# Patient Record
Sex: MALE | Race: WHITE | Employment: FULL TIME | URBAN - NONMETROPOLITAN AREA
[De-identification: names, ages, dates, MRNs, and addresses within clinical notes are randomized per-mention and may not be internally consistent; named-entity substitution may affect disease eponyms.]

---

## 2018-07-31 ENCOUNTER — HOSPITAL ENCOUNTER (OUTPATIENT)
Dept: ULTRASOUND IMAGING | Age: 48
Discharge: HOME | End: 2018-07-31
Payer: COMMERCIAL

## 2018-07-31 DIAGNOSIS — R10.9 ABDOMINAL PAIN, UNSPECIFIED ABDOMINAL LOCATION: ICD-10-CM

## 2018-07-31 PROCEDURE — 76705 ECHO EXAM OF ABDOMEN: CPT

## 2018-09-27 NOTE — PROGRESS NOTES
[facility]  [facility]  New Patient Evaluation in Office  Pt Name: Sarah Hawley  Date of Birth 1970   Today's Date: 9/28/2018  Medical Record Number: [id number]  Referring Provider: No ref. provider found  Primary Care Provider: Connie Wells MD  Chief Complaint   Patient presents with   [facility] Surgical Consult     new pt-screening colonoscopy-family history-no previous     ASSESSMENT       Diagnosis Orders   1. Screening for colorectal cancer  COLONOSCOPY W/ OR W/O BIOPSY     Past Medical History:   Diagnosis Date    Anxiety     Depression     Diabetes ([facility] [address]     Hyperlipidemia     Hypertension           PLANS      1. Schedule Jerrod for colonoscopy with possible biopsy/polypectomy  2. Potential diagnostic/therapeutic options and alternatives were discussed with the patient in the office. Potential risks of bleeding, infection, perforation and missed lesions was reviewed. Potential for biopsy and/or polypectomy was discussed. We also discussed the use of IV sedation and colon preparation instructions. The patient was given an opportunity to ask questions. Once answered, the patient was agreeable to proceed with the procedure. 3. Status: Outpatient in endoscopy  4. Planned anesthesia: IV sedation provided by myself  5. Perioperative bowel preparation with Miralax and Dulcolax. Instructions given and questions answered. Robert Rodriguez is a 52 y.o. male seen in the consultation for evaluation regarding colonoscopy. He has never had a colonoscopy. He has a family history of colon cancer. He denies any history of previous adenomatous polyp, previous colon cancer, rectal bleeding, melena, iron deficiency anemia, chronic abdominal pain, change in bowel habits, unexplained weight loss.   Past Medical History  Past Medical History:   Diagnosis Date    Anxiety     Depression     Diabetes ([facility] [address]     Hyperlipidemia     Hypertension      Past Surgical History  Past Surgical History:   Procedure Laterality Date    CYST REMOVAL  1993    [facility]     Medications  Current Outpatient Prescriptions   Medication Sig Dispense Refill    metFORMIN (GLUCOPHAGE) 500 MG tablet Take 500 mg by mouth 2 times daily (with meals)      Azilsartan-Chlorthalidone (EDARBYCLOR) 40-25 MG TABS Take by mouth      amLODIPine (NORVASC) 5 MG tablet Take 5 mg by mouth daily      lamoTRIgine (LAMICTAL) 25 MG tablet Take 25 mg by mouth daily       No current facility-administered medications for this visit. Allergies  has No Known Allergies. Family History  family history includes Cancer in his maternal uncle and mother. Social History   reports that he has never smoked. He has never used smokeless tobacco. He reports that he does not drink alcohol or use drugs. Health Screening Exams  Health Maintenance   Topic Date Due    HIV screen  11/20/1985    DTaP/Tdap/Td vaccine (1 - Tdap) 11/20/1989    Lipid screen  11/20/2010    Diabetes screen  11/20/2010    Flu vaccine (1) 09/01/2018     Review of Systems  Constitutional: Negative for activity change, appetite change, chills, diaphoresis, fatigue, fever and unexpected weight change. HENT: Negative for congestion, dental problem, drooling, ear discharge, ear pain, facial swelling, hearing loss, mouth sores, nosebleeds, postnasal drip, rhinorrhea, sinus pressure, sneezing, sore throat, tinnitus, trouble swallowing and voice change. Eyes: Negative for photophobia, pain, discharge, redness, itching and visual disturbance. Respiratory: Negative for apnea, cough, choking, chest tightness, shortness of breath, wheezing and stridor. Cardiovascular: Negative for chest pain, palpitations and leg swelling. Gastrointestinal: Negative for abdominal distention, abdominal pain, anal bleeding, blood in stool, constipation, diarrhea, nausea, rectal pain and vomiting.    Endocrine: Negative for cold intolerance, heat intolerance, polydipsia, polyphagia and polyuria. Genitourinary: Negative for decreased urine volume, difficulty urinating, dysuria, enuresis, flank pain, frequency, genital sores, hematuria and urgency. Musculoskeletal: Negative for arthralgias, back pain, gait problem, joint swelling, myalgias, neck pain and neck stiffness. Skin: Negative for color change, pallor, rash and wound. Allergic/Immunologic: Negative for environmental allergies, food allergies and immunocompromised state. Neurological: Negative for dizziness, tremors, seizures, syncope, facial asymmetry, speech difficulty, weakness, light-headedness, numbness and headaches. Hematological: Negative for adenopathy. Does not bruise/bleed easily. Psychiatric/Behavioral: Negative for agitation, behavioral problems, confusion, decreased concentration, dysphoric mood, hallucinations, self-injury, sleep disturbance and suicidal ideas. The patient is not nervous/anxious and is not hyperactive. OBJECTIVE    VITALS:  height is 5' 11\" (1.803 m) and weight is 276 lb 4.8 oz (125.3 kg). His tympanic temperature is 98.6 °F (37 °C). His blood pressure is 136/80 and his pulse is 80. His respiration is 16 and oxygen saturation is 97%. CONSTITUTIONAL: Alert and oriented times 3, no acute distress and cooperative to examination with proper mood and affect. SKIN: Skin color, texture, turgor normal. No rashes or lesions. LYMPH: no cervical nodes, no inguinal nodes  HEENT: Head is normocephalic, atraumatic. EOMI, PERRLA. NECK: Supple, symmetrical, trachea midline, no adenopathy, thyroid symmetric, not enlarged and no tenderness, skin normal.  CHEST/LUNGS: chest symmetric with normal A/P diameter, normal respiratory rate and rhythm, lungs clear to auscultation without wheezes, rales or rhonchi. No accessory muscle use. Scars None   CARDIOVASCULAR: Heart sounds are normal.  Regular rate and rhythm without murmur, gallop or rub. Normal S1 and S2.  Carotid and femoral pulses 2+/4 and equal bilaterally. ABDOMEN: obese No scar(s) present. Normal bowel sounds. No bruits. soft, nontender, nondistended, no masses or organomegaly. no evidence of hernia. Percussion: Normal without hepatosplenomegally. RECTAL: deferred, not clinically indicated  NEUROLOGIC: There are no focalizing motor or sensory deficits. CN II-XII are grossly intact. Janace [place] EXTREMITIES: no cyanosis, no clubbing and no edema. Thank you for the interesting evaluation. Further recommendations as listed above.         Electronically signed by Eder Mace DO on 9/28/2018 at 1:26 PM

## 2018-09-28 ENCOUNTER — OFFICE VISIT (OUTPATIENT)
Dept: SURGERY | Age: 48
End: 2018-09-28

## 2018-09-28 VITALS
RESPIRATION RATE: 16 BRPM | HEIGHT: 71 IN | TEMPERATURE: 98.6 F | HEART RATE: 80 BPM | DIASTOLIC BLOOD PRESSURE: 80 MMHG | SYSTOLIC BLOOD PRESSURE: 136 MMHG | WEIGHT: 276.3 LBS | BODY MASS INDEX: 38.68 KG/M2 | OXYGEN SATURATION: 97 %

## 2018-09-28 DIAGNOSIS — Z12.12 SCREENING FOR COLORECTAL CANCER: Primary | ICD-10-CM

## 2018-09-28 DIAGNOSIS — Z12.11 SCREENING FOR COLORECTAL CANCER: Primary | ICD-10-CM

## 2018-09-28 PROCEDURE — 99024 POSTOP FOLLOW-UP VISIT: CPT | Performed by: SURGERY

## 2018-09-28 RX ORDER — LAMOTRIGINE 25 MG/1
25 TABLET ORAL DAILY
COMMUNITY

## 2018-09-28 RX ORDER — AMLODIPINE BESYLATE 5 MG/1
10 TABLET ORAL DAILY
COMMUNITY

## 2018-09-28 ASSESSMENT — ENCOUNTER SYMPTOMS
SINUS PRESSURE: 0
CHEST TIGHTNESS: 0
DIARRHEA: 0
BLOOD IN STOOL: 0
VOMITING: 0
SORE THROAT: 0
WHEEZING: 0
TROUBLE SWALLOWING: 0
CONSTIPATION: 0
COLOR CHANGE: 0
CHOKING: 0
RHINORRHEA: 0
SHORTNESS OF BREATH: 0
RECTAL PAIN: 0
EYE PAIN: 0
PHOTOPHOBIA: 0
FACIAL SWELLING: 0
NAUSEA: 0
COUGH: 0
ABDOMINAL PAIN: 0
BACK PAIN: 0
EYE DISCHARGE: 0
EYE ITCHING: 0
STRIDOR: 0
ABDOMINAL DISTENTION: 0
EYE REDNESS: 0
VOICE CHANGE: 0
ANAL BLEEDING: 0
APNEA: 0

## 2018-09-28 NOTE — PROGRESS NOTES
Subjective:      Patient ID: Ava Pabon is a 52 y.o. male. Chief Complaint   Patient presents with    Surgical Consult     new pt-screening colonoscopy-family history-no previous       HPI    Review of Systems   Constitutional: Negative for activity change, appetite change, chills, diaphoresis, fatigue, fever and unexpected weight change. HENT: Negative for congestion, dental problem, drooling, ear discharge, ear pain, facial swelling, hearing loss, mouth sores, nosebleeds, postnasal drip, rhinorrhea, sinus pressure, sneezing, sore throat, tinnitus, trouble swallowing and voice change. Eyes: Negative for photophobia, pain, discharge, redness, itching and visual disturbance. Respiratory: Negative for apnea, cough, choking, chest tightness, shortness of breath, wheezing and stridor. Cardiovascular: Negative for chest pain, palpitations and leg swelling. Gastrointestinal: Negative for abdominal distention, abdominal pain, anal bleeding, blood in stool, constipation, diarrhea, nausea, rectal pain and vomiting. Endocrine: Negative for cold intolerance, heat intolerance, polydipsia, polyphagia and polyuria. Genitourinary: Negative for decreased urine volume, difficulty urinating, dysuria, enuresis, flank pain, frequency, genital sores, hematuria and urgency. Musculoskeletal: Negative for arthralgias, back pain, gait problem, joint swelling, myalgias, neck pain and neck stiffness. Skin: Negative for color change, pallor, rash and wound. Allergic/Immunologic: Negative for environmental allergies, food allergies and immunocompromised state. Neurological: Negative for dizziness, tremors, seizures, syncope, facial asymmetry, speech difficulty, weakness, light-headedness, numbness and headaches. Hematological: Negative for adenopathy. Does not bruise/bleed easily.    Psychiatric/Behavioral: Negative for agitation, behavioral problems, confusion, decreased concentration, dysphoric mood,

## 2018-09-28 NOTE — LETTER
[facility]  Sheryle RidesRosalinda Upton  Phone- [phone]  Fax [phone]    Pt Name: Sanaz Carrington  Medical Record Number: [id number]  Date of Birth 1970   Today's Date: 9/28/2018    CHEL EYAD dhruv Soto was seen in consultation in the office today. My assessment and plans are listed below. ASSESSMENT         Diagnosis Orders   1. Screening for colorectal cancer  COLONOSCOPY W/ OR W/O BIOPSY     Past Medical History:   Diagnosis Date    Anxiety     Depression     Diabetes ([address]     Hyperlipidemia     Hypertension          PLANS:      1. Schedule Jerrod for colonoscopy with possible biopsy/polypectomy  2. Potential diagnostic/therapeutic options and alternatives were discussed with the patient in the office. Potential risks of bleeding, infection, perforation and missed lesions was reviewed. Potential for biopsy and/or polypectomy was discussed. We also discussed the use of IV sedation and colon preparation instructions. The patient was given an opportunity to ask questions. Once answered, the patient was agreeable to proceed with the procedure. 3. Status: Outpatient in endoscopy  4. Planned anesthesia: IV sedation provided by myself  5. Perioperative bowel preparation with Miralax and Dulcolax. Instructions given and questions answered. If I can provide any additional assistance or you have any concerns, please feel free to contact me. Thank you for allowing to participate in the care of your patients. Sincerely,      Sheryle Rides.  Birdie Chambers

## 2018-10-15 ENCOUNTER — HOSPITAL ENCOUNTER (OUTPATIENT)
Age: 48
Setting detail: OUTPATIENT SURGERY
Discharge: HOME | End: 2018-10-15
Attending: SURGERY | Admitting: SURGERY
Payer: COMMERCIAL

## 2018-10-15 VITALS
DIASTOLIC BLOOD PRESSURE: 55 MMHG | HEIGHT: 71 IN | TEMPERATURE: 97.1 F | SYSTOLIC BLOOD PRESSURE: 116 MMHG | BODY MASS INDEX: 37.57 KG/M2 | WEIGHT: 268.4 LBS | OXYGEN SATURATION: 97 % | HEART RATE: 67 BPM | RESPIRATION RATE: 18 BRPM

## 2018-10-15 DIAGNOSIS — Z12.12 SCREENING FOR COLORECTAL CANCER: ICD-10-CM

## 2018-10-15 DIAGNOSIS — Z12.11 SCREENING FOR COLORECTAL CANCER: ICD-10-CM

## 2018-10-15 PROCEDURE — 2580000003 HC RX 258: Performed by: SURGERY

## 2018-10-15 PROCEDURE — 7100000001 HC PACU RECOVERY - ADDTL 15 MIN: Performed by: SURGERY

## 2018-10-15 PROCEDURE — 99152 MOD SED SAME PHYS/QHP 5/>YRS: CPT | Performed by: SURGERY

## 2018-10-15 PROCEDURE — 6360000002 HC RX W HCPCS: Performed by: SURGERY

## 2018-10-15 PROCEDURE — 45378 DIAGNOSTIC COLONOSCOPY: CPT | Performed by: SURGERY

## 2018-10-15 PROCEDURE — 7100000000 HC PACU RECOVERY - FIRST 15 MIN: Performed by: SURGERY

## 2018-10-15 PROCEDURE — 3609027000 HC COLONOSCOPY: Performed by: SURGERY

## 2018-10-15 PROCEDURE — 2709999900 HC NON-CHARGEABLE SUPPLY: Performed by: SURGERY

## 2018-10-15 RX ORDER — FENTANYL CITRATE 50 UG/ML
INJECTION, SOLUTION INTRAMUSCULAR; INTRAVENOUS PRN
Status: DISCONTINUED | OUTPATIENT
Start: 2018-10-15 | End: 2018-10-15 | Stop reason: HOSPADM

## 2018-10-15 RX ORDER — ALLOPURINOL 100 MG/1
100 TABLET ORAL DAILY
COMMUNITY

## 2018-10-15 RX ORDER — 0.9 % SODIUM CHLORIDE 0.9 %
10 VIAL (ML) INJECTION EVERY 12 HOURS SCHEDULED
Status: DISCONTINUED | OUTPATIENT
Start: 2018-10-15 | End: 2018-10-15 | Stop reason: HOSPADM

## 2018-10-15 RX ORDER — SODIUM CHLORIDE 450 MG/100ML
INJECTION, SOLUTION INTRAVENOUS CONTINUOUS
Status: DISCONTINUED | OUTPATIENT
Start: 2018-10-15 | End: 2018-10-15 | Stop reason: HOSPADM

## 2018-10-15 RX ORDER — MIDAZOLAM HYDROCHLORIDE 1 MG/ML
INJECTION INTRAMUSCULAR; INTRAVENOUS PRN
Status: DISCONTINUED | OUTPATIENT
Start: 2018-10-15 | End: 2018-10-15 | Stop reason: HOSPADM

## 2018-10-15 RX ORDER — 0.9 % SODIUM CHLORIDE 0.9 %
10 VIAL (ML) INJECTION PRN
Status: DISCONTINUED | OUTPATIENT
Start: 2018-10-15 | End: 2018-10-15 | Stop reason: HOSPADM

## 2018-10-15 RX ADMIN — SODIUM CHLORIDE: 4.5 INJECTION, SOLUTION INTRAVENOUS at 08:09

## 2018-10-15 ASSESSMENT — PAIN SCALES - GENERAL
PAINLEVEL_OUTOF10: 0

## 2018-10-15 ASSESSMENT — PAIN - FUNCTIONAL ASSESSMENT: PAIN_FUNCTIONAL_ASSESSMENT: 0-10

## 2018-10-15 NOTE — H&P
[address] [address]  Sedation/Analgesia History & Physical  Dr. Leonel Espinoza. Trish    Pt Name: Loreta Beach  MRN: [id number]  YOB: 1970  Provider Performing Procedure: Hawa Chand  Primary Care Physician: Stephan Reynolds MD  PRE-PROCEDURE   DNR-CCA/DNR-CC - No  Brief History/Pre-Procedure Diagnosis: SCREENING    MEDICAL HISTORY       has a past medical history of Anxiety; Arthritis; Depression; Diabetes ([address] Hyperlipidemia; and Hypertension. SURGICAL HISTORY   has a past surgical history that includes cyst removal (1993). ALLERGIES   Allergies as of 09/28/2018    (No Known Allergies)     MEDICATIONS   Coumadin Use Last 7 Days: No  Antiplatelet drug therapy use last 7 days: No  Other anticoagulant use last 7 days: No  Prior to Admission medications    Medication Sig Start Date End Date Taking? Authorizing Provider   allopurinol (ZYLOPRIM) 100 MG tablet Take 100 mg by mouth daily   Yes Historical Provider, MD   metFORMIN (GLUCOPHAGE) 500 MG tablet Take 500 mg by mouth 2 times daily (with meals)   Yes Historical Provider, MD   Azilsartan-Chlorthalidone (EDARBYCLOR) 40-25 MG TABS Take by mouth   Yes Historical Provider, MD   amLODIPine (NORVASC) 5 MG tablet Take 5 mg by mouth daily   Yes Historical Provider, MD   lamoTRIgine (LAMICTAL) 25 MG tablet Take 25 mg by mouth daily   Yes Historical Provider, MD     PHYSICAL EXAMINATION   Vitals:  height is 5' 11\" (1.803 m) and weight is 268 lb 6.4 oz (121.7 kg). His temporal temperature is 97.9 °F (36.6 °C). His blood pressure is 130/60 and his pulse is 67. His respiration is 18 and oxygen saturation is 95%.    Mental Status: alert, cooperative  Heart:   Heart regular rate and rhythm     Lungs:  Clear      Abdomen:  Soft, nontender       PLANNED PROCEDURE   Colonoscopy  SEDATION   Planned agent for conscious sedation: Fentanyl, Versed  ASA Classification: 2  Class 1: A normal healthy patient  Class 2: Pt with mild to moderate

## 2018-10-15 NOTE — OP NOTE
[facility]  RECORD OF COLONOSCOPY  PATIENT NAME: Cyrus Riley  MEDICAL RECORD NO. [id number]  SURGEON: Yair Morejon. LEOLA Chambers [facility]  PRIMARY CARE PHYSICIAN: Xiao Gonzales MD     PROCEDURE PERFORMED:  10/15/2018   PREOPERATIVE DIAGNOSIS:  SCREENING  POSTOPERATIVE DIAGNOSIS:  Sigmoid diverticulosis  PROCEDURE PERFORMED:  Colonoscopy   ANESTHESIA:  IV sedation with 100 mcg Fentanyl and 7.5 mg Versed  ESTIMATED BLOOD LOSS:  None. SPECIMENS: None  COMPLICATIONS:  None immediately appreciated. DISCUSSION:  Rolly Solorzano is a 52y.o.-year-old male who presented to my office and seen in evaluation at request of Xiao Gonzales MD. After history and physical examination was performed, potential diagnostic and therapeutic modalities discussed with the patient. Radiographic and endoscopic studies were discussed, risks, complications, benefits reviewed. He was given opportunity to ask questions, once answeredinformed consent was obtained. The patient was the Endoscopy Suite on 10/15/2018 for procedure. OPERATIVE FINDINGS:  At the time of endoscopy good prep appreciated. Scope was able to be advanced to level of cecum. There was no evidence of intraluminal or mucosal pathology with the exception of sigmoid diverticulosis. PROCEDURE:  The patient was brought to endoscopy suite, placed in left lateral Bruno position, placed under continuous cardiac telemetry, blood pressure, pulse oximetry monitoring, placed under IV sedation with medications noted above, done in incremental fashion to achieve sedation. Digital rectal exam performed revealing adequate rectal tone, no masses palpable. Colonoscope advanced to rectum, rectum gently insufflated. Under direct visualization colonoscope was advanced entirety of colon to level of cecum, confirmed by ileocecal valve, triangle folds, appendiceal orifice. The scope was slowly retracted, intraluminal structures inspected.  The scope was retracted through the

## 2020-01-30 NOTE — PROGRESS NOTES
0-64 years Vaccine  Aged Out     Review of Systems  History obtained from the patient. Constitutional: Denies any fever, chills. Derm: Denies any rash or skin color change. Eyes: Denies blurred or decreased in vision. Ent: Denies any tinnitus or vertigo. Resp: Denies any cough or shortness of breath. CV: Denies any syncope, palpitations or chest pain. GI:  Admits to abdominal pain and constipation, denies nausea, vomiting or diarrhea. : Denies any hematuria, hesitancy, or dysuria. Heme/Lymph: Denies any bleeding. Musculoskeletal: Denies any myalgias, muscle weakness or neck pain. Neuro: Denies any dizziness, paresthesia or weakness. OBJECTIVE      VITALS:  height is 5' 11\" (1.803 m) and weight is 252 lb 9.6 oz (114.6 kg). His temporal temperature is 97.4 °F (36.3 °C). His blood pressure is 142/86 (abnormal) and his pulse is 83. His respiration is 15 and oxygen saturation is 98%. Pain Score:   2 Body mass index is 35.23 kg/m². CONSTITUTIONAL: Alert and oriented times 3, no acute distress and cooperative to examination with proper mood and affect. SKIN: Skin color, texture, turgor normal. No rashes or lesions. LYMPH: no cervical nodes, no inguinal nodes  HEENT: Head is normocephalic, atraumatic. EOMI, PERRLA. NECK: Supple, symmetrical, trachea midline, no adenopathy, thyroid symmetric, not enlarged and no tenderness, skin normal.  CHEST/LUNGS: chest symmetric with normal A/P diameter, normal respiratory rate and rhythm, lungs clear to auscultation without wheezes, rales or rhonchi. No accessory muscle use. Scars None   CARDIOVASCULAR: Heart sounds are normal.  Regular rate and rhythm without murmur, gallop or rub. Normal S1 and S2. Carotid and femoral pulses 2+/4 and equal bilaterally. ABDOMEN: obese No scar(s) present. Normal bowel sounds. No bruits. soft, nondistended, no masses or organomegaly. no evidence of hernia. Percussion: Normal without hepatosplenomegally.  Gallbladder is nonpalpable. He has tenderness right lateral abdomen but not right subcostal.   RECTAL: deferred, not clinically indicated  NEUROLOGIC: There are no focalizing motor or sensory deficits. CN II-XII are grossly intact. [place] EXTREMITIES: no cyanosis, no clubbing and no edema. Thank you for the interesting evaluation. Further recommendations as listed above.        Electronically signed by Oly Kennedy DO on 1/31/2020 at 1:54 PM

## 2020-01-31 ENCOUNTER — OFFICE VISIT (OUTPATIENT)
Dept: SURGERY | Age: 50
End: 2020-01-31
Payer: COMMERCIAL

## 2020-01-31 VITALS
HEART RATE: 83 BPM | SYSTOLIC BLOOD PRESSURE: 142 MMHG | BODY MASS INDEX: 35.36 KG/M2 | RESPIRATION RATE: 15 BRPM | DIASTOLIC BLOOD PRESSURE: 86 MMHG | WEIGHT: 252.6 LBS | TEMPERATURE: 97.4 F | HEIGHT: 71 IN | OXYGEN SATURATION: 98 %

## 2020-01-31 PROCEDURE — 99213 OFFICE O/P EST LOW 20 MIN: CPT | Performed by: SURGERY

## 2020-01-31 RX ORDER — ACETAMINOPHEN 160 MG
TABLET,DISINTEGRATING ORAL
COMMUNITY

## 2020-01-31 RX ORDER — POTASSIUM CITRATE 10 MEQ/1
99 TABLET, EXTENDED RELEASE ORAL 2 TIMES DAILY
COMMUNITY
End: 2021-05-13

## 2020-01-31 RX ORDER — MULTIVIT WITH MINERALS/LUTEIN
1000 TABLET ORAL 2 TIMES DAILY
COMMUNITY

## 2020-01-31 NOTE — PATIENT INSTRUCTIONS
Patient Education        Cholecystectomy: Before Your Surgery  What is cholecystectomy? Cholecystectomy (tm-bmy-asa-JONN-tuh-zaki) is a type of surgery. It removes a diseased gallbladder. This surgery is usually done as a laparoscopic surgery. The doctor puts a lighted tube and other surgical tools through small cuts (incisions) in your belly. The tube is called a scope. It lets your doctor see your organs so he or she can do the surgery. The incisions leave scars that fade with time. Most people go home the same day. You probably will feel better each day. Most people have only a small amount of pain after 1 week. If you have a desk job, you can probably go back to work in 1 to 2 weeks. If you lift heavy objects or have a very active job, it may take up to 4 weeks. In some cases, open surgery is the best choice. Your doctor may choose open surgery in advance. Or he or she may choose it in the middle of laparoscopic surgery. In open surgery, the doctor makes a larger incision in your upper belly. If you have open surgery, you will probably stay in the hospital for 2 to 4 days. And it may take 4 to 6 weeks to get back to your normal routine. Follow-up care is a key part of your treatment and safety. Be sure to make and go to all appointments, and call your doctor if you are having problems. It's also a good idea to know your test results and keep a list of the medicines you take. What happens before surgery?   Surgery can be stressful. This information will help you understand what you can expect. And it will help you safely prepare for surgery.   Preparing for surgery    · Understand exactly what surgery is planned, along with the risks, benefits, and other options. · Tell your doctors ALL the medicines, vitamins, supplements, and herbal remedies you take.  Some of these can increase the risk of bleeding or interact with anesthesia.     · If you take aspirin or some other blood thinner, ask your doctor if Instructions. \"     If you do not have an account, please click on the \"Sign Up Now\" link. Current as of: August 21, 2019  Content Version: 12.3  © 2565-5866 [employer]wise, [place]. Care instructions adapted under license by [facility] ([facility]). If you have questions about a medical condition or this instruction, always ask your healthcare professional. [address] any warranty or liability for your use of this information. Patient Education        HIDA Scan: About This Test  What is it? A HIDA scan is an imaging test that checks how your gallbladder is working. The gallbladder is a small sac under your liver. It stores bile, a fluid that helps your body digest fats. If there are problems with the gallbladder, such as gallstones, the gallbladder may not store or empty bile properly. During a HIDA scan, a camera takes pictures of your gallbladder after a radioactive tracer is injected into a vein in your arm. The tracer travels through your liver, gallbladder, bile ducts, and small intestine. The camera takes a series of pictures of the tracer as it moves along. Your doctor can use these pictures to look for leaks, blockages, or any other problems. Why is this test done? The HIDA scan may be done to:  · Help find the cause of pain in the upper belly, especially if the pain is on the right side. · Find out if bile is leaking. · Find anything that may be blocking the bile ducts. A HIDA scan is sometimes done if an earlier ultrasound test did not give enough information. How do you prepare for the test?  · If you are breastfeeding, you may want to pump enough breast milk before the test to get through 1 to 2 days of feeding. The radioactive tracer used in this test can get into your breast milk and is not good for the baby. · The doctor may tell you not to eat or drink anything but water for 4 to 6 hours before the test. Follow all instructions carefully.  If you haven't eaten for more than 24 hours before the test, tell your doctor. How is the test done? · You will remove any clothing around your belly. You will be given a gown or paper covering to use during the test.  · You will lie on your back on a table. · A thin tube, called an IV, will be put into a vein in your arm. · A radioactive tracer chemical will be injected into the IV. A medicine that stimulates your gallbladder may also be injected. · The scanning camera will be placed close over your belly. · A picture will be taken right away. The whole scan may last up to 60 minutes as the tracer passes through your liver and into your gallbladder and small intestine. Several more pictures, each lasting a few minutes, may be taken over the next 2 to 4 hours. Each picture will take only a few minutes, but you will have to lie still for the whole test.  What else should you know about the test?  · The HIDA scan itself is painless, but you may feel a brief sting or pinch as the IV is placed in your arm. · You may feel a brief pain in your belly as the medicine that stimulates your gallbladder starts to work. · Anytime you're exposed to radiation, there's a small chance of damage to cells or tissue. That's the case even with the low-level radioactive tracer used for this test. But the chance of damage is very low compared with the benefits of the test.  · The camera itself does not produce any radiation. · Most of the tracer will leave your body through your urine or stool within a day. So be sure to flush the toilet right after you use it, and wash your hands well with soap and water. The amount of radiation in the tracer is very small. This means it isn't a risk for people to be around you after the test.  What happens after the test?  · You will probably be able to go home right away. · Your doctor will discuss the results of the test with you. · You can go back to your usual activities right away.   · Anytime you're exposed to

## 2020-01-31 NOTE — [place]  [address] [facility]. [address]  [place] [address]  [phone]     Pt Name: Samanta Kellogg  Medical Record Number: [id number]  Date of Birth 1970   Today's Date: 1/31/2020    Adeola Washington was seen in consultation in the office today. My assessment and plans are listed below. ASSESSMENT      Diagnosis Orders   1. Chronic cholecystitis with calculus  Hepatic Function Panel    NM [facility]   2. RUQ abdominal pain  NM [facility]   3. Epigastric pain  NM [facility]     Past Medical History:   Diagnosis Date    Anxiety     Arthritis     Depression     Diabetes ([facility] [address]     Hyperlipidemia     Hypertension          PLANS:   1. Schedule Jerrod for HIDA scan to further evaluate. His symptoms are atypical of gallbladder disease  2. Follow up after testing completed. If I can provide any additional assistance or you have any concerns, please feel free to contact me. Thank you for allowing to participate in the care of your patients. Sincerely,      Sidra Woodall.  Birdie Chambers

## 2020-02-06 ENCOUNTER — HOSPITAL ENCOUNTER (EMERGENCY)
Age: 50
Discharge: HOME | End: 2020-02-06
Attending: EMERGENCY MEDICINE
Payer: COMMERCIAL

## 2020-02-06 ENCOUNTER — APPOINTMENT (OUTPATIENT)
Dept: CT IMAGING | Age: 50
End: 2020-02-06
Payer: COMMERCIAL

## 2020-02-06 VITALS
OXYGEN SATURATION: 97 % | WEIGHT: 250 LBS | TEMPERATURE: 98.3 F | RESPIRATION RATE: 18 BRPM | HEIGHT: 71 IN | BODY MASS INDEX: 35 KG/M2 | HEART RATE: 69 BPM | DIASTOLIC BLOOD PRESSURE: 80 MMHG | SYSTOLIC BLOOD PRESSURE: 141 MMHG

## 2020-02-06 LAB
ALBUMIN SERPL-MCNC: 4.6 G/DL (ref 3.5–5.1)
ALP BLD-CCNC: 33 U/L (ref 38–126)
ALT SERPL-CCNC: 42 U/L (ref 11–66)
ANION GAP SERPL CALCULATED.3IONS-SCNC: 15 MEQ/L (ref 8–16)
AST SERPL-CCNC: 30 U/L (ref 5–40)
BACTERIA: ABNORMAL /HPF
BASOPHILS # BLD: 0.3 %
BASOPHILS ABSOLUTE: 0 THOU/MM3 (ref 0–0.1)
BILIRUB SERPL-MCNC: 0.3 MG/DL (ref 0.3–1.2)
BILIRUBIN DIRECT: < 0.2 MG/DL (ref 0–0.3)
BILIRUBIN URINE: NEGATIVE
BLOOD, URINE: NEGATIVE
BUN BLDV-MCNC: 12 MG/DL (ref 7–22)
CALCIUM SERPL-MCNC: 10 MG/DL (ref 8.5–10.5)
CASTS 2: ABNORMAL /LPF
CASTS UA: ABNORMAL /LPF
CHARACTER, URINE: CLEAR
CHLORIDE BLD-SCNC: 101 MEQ/L (ref 98–111)
CO2: 28 MEQ/L (ref 23–33)
COLOR: YELLOW
CREAT SERPL-MCNC: 1 MG/DL (ref 0.4–1.2)
CRYSTALS, UA: ABNORMAL
EKG ATRIAL RATE: 73 BPM
EKG P AXIS: 62 DEGREES
EKG P-R INTERVAL: 172 MS
EKG Q-T INTERVAL: 400 MS
EKG QRS DURATION: 96 MS
EKG QTC CALCULATION (BAZETT): 440 MS
EKG R AXIS: 48 DEGREES
EKG T AXIS: 37 DEGREES
EKG VENTRICULAR RATE: 73 BPM
EOSINOPHIL # BLD: 1.6 %
EOSINOPHILS ABSOLUTE: 0.2 THOU/MM3 (ref 0–0.4)
EPITHELIAL CELLS, UA: ABNORMAL /HPF
ERYTHROCYTE [DISTWIDTH] IN BLOOD BY AUTOMATED COUNT: 12.7 % (ref 11.5–14.5)
ERYTHROCYTE [DISTWIDTH] IN BLOOD BY AUTOMATED COUNT: 42.3 FL (ref 35–45)
ETHYL ALCOHOL, SERUM: < 0.01 %
GFR SERPL CREATININE-BSD FRML MDRD: 79 ML/MIN/1.73M2
GLUCOSE BLD-MCNC: 97 MG/DL (ref 70–108)
GLUCOSE URINE: NEGATIVE MG/DL
HCT VFR BLD CALC: 48.6 % (ref 42–52)
HEMOGLOBIN: 16.1 GM/DL (ref 14–18)
IMMATURE GRANS (ABS): 0.06 THOU/MM3 (ref 0–0.07)
IMMATURE GRANULOCYTES: 0.6 %
KETONES, URINE: NEGATIVE
LEUKOCYTE ESTERASE, URINE: ABNORMAL
LIPASE: 29.3 U/L (ref 5.6–51.3)
LYMPHOCYTES # BLD: 33.2 %
LYMPHOCYTES ABSOLUTE: 3.6 THOU/MM3 (ref 1–4.8)
MAGNESIUM: 2 MG/DL (ref 1.6–2.4)
MCH RBC QN AUTO: 30.3 PG (ref 26–33)
MCHC RBC AUTO-ENTMCNC: 33.1 GM/DL (ref 32.2–35.5)
MCV RBC AUTO: 91.4 FL (ref 80–94)
MISCELLANEOUS 2: ABNORMAL
MONOCYTES # BLD: 8.6 %
MONOCYTES ABSOLUTE: 0.9 THOU/MM3 (ref 0.4–1.3)
NITRITE, URINE: NEGATIVE
NUCLEATED RED BLOOD CELLS: 0 /100 WBC
OSMOLALITY CALCULATION: 286.5 MOSMOL/KG (ref 275–300)
PH UA: 7.5 (ref 5–9)
PLATELET # BLD: 225 THOU/MM3 (ref 130–400)
PMV BLD AUTO: 10.1 FL (ref 9.4–12.4)
POTASSIUM SERPL-SCNC: 3.4 MEQ/L (ref 3.5–5.2)
PROTEIN UA: NEGATIVE
RBC # BLD: 5.32 MILL/MM3 (ref 4.7–6.1)
RBC URINE: ABNORMAL /HPF
RENAL EPITHELIAL, UA: ABNORMAL
SEG NEUTROPHILS: 55.7 %
SEGMENTED NEUTROPHILS ABSOLUTE COUNT: 6 THOU/MM3 (ref 1.8–7.7)
SODIUM BLD-SCNC: 144 MEQ/L (ref 135–145)
SPECIFIC GRAVITY, URINE: 1 (ref 1–1.03)
TOTAL PROTEIN: 7.5 G/DL (ref 6.1–8)
TROPONIN T: < 0.01 NG/ML
TSH SERPL DL<=0.05 MIU/L-ACNC: 3.3 UIU/ML (ref 0.4–4.2)
UROBILINOGEN, URINE: 0.2 EU/DL (ref 0–1)
WBC # BLD: 10.7 THOU/MM3 (ref 4.8–10.8)
WBC UA: ABNORMAL /HPF
YEAST: ABNORMAL

## 2020-02-06 PROCEDURE — 6370000000 HC RX 637 (ALT 250 FOR IP): Performed by: EMERGENCY MEDICINE

## 2020-02-06 PROCEDURE — 81001 URINALYSIS AUTO W/SCOPE: CPT

## 2020-02-06 PROCEDURE — 84484 ASSAY OF TROPONIN QUANT: CPT

## 2020-02-06 PROCEDURE — 85025 COMPLETE CBC W/AUTO DIFF WBC: CPT

## 2020-02-06 PROCEDURE — 80053 COMPREHEN METABOLIC PANEL: CPT

## 2020-02-06 PROCEDURE — 93005 ELECTROCARDIOGRAM TRACING: CPT | Performed by: EMERGENCY MEDICINE

## 2020-02-06 PROCEDURE — 74177 CT ABD & PELVIS W/CONTRAST: CPT

## 2020-02-06 PROCEDURE — 83735 ASSAY OF MAGNESIUM: CPT

## 2020-02-06 PROCEDURE — 70450 CT HEAD/BRAIN W/O DYE: CPT

## 2020-02-06 PROCEDURE — G0480 DRUG TEST DEF 1-7 CLASSES: HCPCS

## 2020-02-06 PROCEDURE — 83690 ASSAY OF LIPASE: CPT

## 2020-02-06 PROCEDURE — 82248 BILIRUBIN DIRECT: CPT

## 2020-02-06 PROCEDURE — 6360000004 HC RX CONTRAST MEDICATION: Performed by: EMERGENCY MEDICINE

## 2020-02-06 PROCEDURE — 36415 COLL VENOUS BLD VENIPUNCTURE: CPT

## 2020-02-06 PROCEDURE — 84443 ASSAY THYROID STIM HORMONE: CPT

## 2020-02-06 PROCEDURE — 99283 EMERGENCY DEPT VISIT LOW MDM: CPT

## 2020-02-06 RX ORDER — ENALAPRILAT 2.5 MG/2ML
1.25 INJECTION INTRAVENOUS ONCE
Status: DISCONTINUED | OUTPATIENT
Start: 2020-02-06 | End: 2020-02-06 | Stop reason: HOSPADM

## 2020-02-06 RX ORDER — CLONIDINE HYDROCHLORIDE 0.1 MG/1
0.1 TABLET ORAL PRN
Qty: 60 TABLET | Refills: 3 | Status: SHIPPED | OUTPATIENT
Start: 2020-02-06

## 2020-02-06 RX ORDER — PANTOPRAZOLE SODIUM 40 MG/1
40 TABLET, DELAYED RELEASE ORAL ONCE
Status: COMPLETED | OUTPATIENT
Start: 2020-02-06 | End: 2020-02-06

## 2020-02-06 RX ORDER — LACTULOSE 10 G/10G
10 SOLUTION ORAL 2 TIMES DAILY
Qty: 14 PACKET | Refills: 0 | Status: SHIPPED | OUTPATIENT
Start: 2020-02-06 | End: 2020-02-13

## 2020-02-06 RX ORDER — ONDANSETRON 4 MG/1
4 TABLET, ORALLY DISINTEGRATING ORAL ONCE
Status: COMPLETED | OUTPATIENT
Start: 2020-02-06 | End: 2020-02-06

## 2020-02-06 RX ORDER — PANTOPRAZOLE SODIUM 40 MG/1
40 TABLET, DELAYED RELEASE ORAL
Qty: 30 TABLET | Refills: 0 | Status: SHIPPED | OUTPATIENT
Start: 2020-02-06 | End: 2021-05-13

## 2020-02-06 RX ORDER — LORAZEPAM 1 MG/1
1 TABLET ORAL ONCE
Status: COMPLETED | OUTPATIENT
Start: 2020-02-06 | End: 2020-02-06

## 2020-02-06 RX ADMIN — PANTOPRAZOLE SODIUM 40 MG: 40 TABLET, DELAYED RELEASE ORAL at 03:13

## 2020-02-06 RX ADMIN — IOPAMIDOL 85 ML: 755 INJECTION, SOLUTION INTRAVENOUS at 04:06

## 2020-02-06 RX ADMIN — ONDANSETRON 4 MG: 4 TABLET, ORALLY DISINTEGRATING ORAL at 03:12

## 2020-02-06 RX ADMIN — LIDOCAINE HYDROCHLORIDE: 20 SOLUTION ORAL; TOPICAL at 03:14

## 2020-02-06 RX ADMIN — LORAZEPAM 1 MG: 1 TABLET ORAL at 04:19

## 2020-02-06 ASSESSMENT — ENCOUNTER SYMPTOMS
EYE DISCHARGE: 0
VOMITING: 0
CONSTIPATION: 0
BLOOD IN STOOL: 0
ABDOMINAL PAIN: 1
NAUSEA: 0
SHORTNESS OF BREATH: 0
SORE THROAT: 0
ABDOMINAL DISTENTION: 0
EYE PAIN: 0
EYE REDNESS: 0
BACK PAIN: 0
TROUBLE SWALLOWING: 0
CHOKING: 0
RHINORRHEA: 0
WHEEZING: 0
COUGH: 0
VOICE CHANGE: 0
DIARRHEA: 0
PHOTOPHOBIA: 0
CHEST TIGHTNESS: 0
EYE ITCHING: 0
SINUS PRESSURE: 0

## 2020-02-06 ASSESSMENT — PAIN SCALES - GENERAL: PAINLEVEL_OUTOF10: 5

## 2020-02-06 NOTE — ED PROVIDER NOTES
[facility]  eMERGENCY dEPARTMENT eNCOUnter          CHIEF COMPLAINT       Chief Complaint   Patient presents with    Hypertension       Nurses Notes reviewed and I agree except as noted in the HPI. HISTORY OF PRESENT ILLNESS    Dino Kimball is a 52 y.o. male who presents with complaints of hypertension and right upper quadrant pain. Patient states that he was at work today and his blood pressure went higher than 200s over 100s. He took an extra amlodipine. He was told to do that by his nurse practitioner out at work. Patient is currently being worked up for cholelithiasis as an outpatient. He has seen Dr. Whit Gay who wanted him to have a HIDA scan performed Monday morning. He stated that his blood pressure was too high and so he did not want to come in and do that. Tonight he was at work had high blood pressure so he decided to come in for evaluation. Currently the patient is resting comfortably on the cot no apparent distress. He complains of right upper quadrant pain which comes and goes. REVIEW OF SYSTEMS     Review of Systems   Constitutional: Negative for activity change, appetite change, diaphoresis, fatigue and unexpected weight change. Uncontrolled hypertension   HENT: Negative for congestion, ear discharge, ear pain, hearing loss, rhinorrhea, sinus pressure, sore throat, trouble swallowing and voice change. Eyes: Negative for photophobia, pain, discharge, redness and itching. Respiratory: Negative for cough, choking, chest tightness, shortness of breath and wheezing. Cardiovascular: Negative for chest pain, palpitations and leg swelling. Gastrointestinal: Positive for abdominal pain (Right upper quadrant and right side). Negative for abdominal distention, blood in stool, constipation, diarrhea, nausea and vomiting. Endocrine: Negative for polydipsia, polyphagia and polyuria.    Genitourinary: Negative for decreased urine volume, difficulty urinating, dysuria, enuresis, frequency, hematuria and urgency. Musculoskeletal: Negative for arthralgias, back pain, gait problem, myalgias, neck pain and neck stiffness. Skin: Negative for pallor and rash. Allergic/Immunologic: Negative for immunocompromised state. Neurological: Negative for dizziness, tremors, seizures, syncope, facial asymmetry, weakness, light-headedness, numbness and headaches. Hematological: Negative for adenopathy. Does not bruise/bleed easily. Psychiatric/Behavioral: Negative for agitation, hallucinations and suicidal ideas. The patient is not nervous/anxious. PAST MEDICAL HISTORY    has a past medical history of Anxiety, Arthritis, Depression, Diabetes ([facility] [address] Hyperlipidemia, and Hypertension. SURGICAL HISTORY      has a past surgical history that includes cyst removal (1993) and pr colon ca scrn not hi rsk ind (N/A, 10/15/2018). CURRENT MEDICATIONS       Discharge Medication List as of 2/6/2020  5:29 AM      CONTINUE these medications which have NOT CHANGED    Details   amLODIPine (NORVASC) 5 MG tablet Take 5 mg by mouth dailyHistorical Med      Ascorbic Acid (VITAMIN C) 1000 MG tablet Take 1,000 mg by mouth 2 times dailyHistorical Med      potassium citrate (UROCIT-K) 10 MEQ (1080 MG) extended release tablet Take 10 mEq by mouth 3 times daily (with meals)Historical Med      Cholecalciferol (VITAMIN D3) 50 MCG (2000 UT) CAPS Take by mouthHistorical Med      Multiple Vitamin (MULTI-VITAMIN DAILY PO) Take by mouthHistorical Med      allopurinol (ZYLOPRIM) 100 MG tablet Take 100 mg by mouth dailyHistorical Med      metFORMIN (GLUCOPHAGE) 500 MG tablet Take 500 mg by mouth 2 times daily (with meals)Historical Med      Azilsartan-Chlorthalidone (EDARBYCLOR) 40-25 MG TABS Take by mouthHistorical Med      lamoTRIgine (LAMICTAL) 25 MG tablet Take 25 mg by mouth dailyHistorical Med             ALLERGIES     has No Known Allergies.     FAMILY HISTORY     He indicated that his mother is . He indicated that his father is alive. He indicated that his sister is alive. He indicated that only one of his two brothers is alive. He indicated that his maternal grandmother is . He indicated that his maternal grandfather is . He indicated that his paternal grandmother is . He indicated that his paternal grandfather is . He indicated that his maternal uncle is . family history includes Cancer in his maternal uncle and mother; Diabetes type 2  in his father; No Known Problems in his brother, brother, maternal grandfather, maternal grandmother, paternal grandfather, paternal grandmother, and sister. SOCIAL HISTORY      reports that he has never smoked. He has never used smokeless tobacco. He reports that he does not drink alcohol or use drugs. PHYSICAL EXAM     INITIAL VITALS:  height is 5' 11\" (1.803 m) and weight is 250 lb (113.4 kg). His oral temperature is 98.3 °F (36.8 °C). His blood pressure is 141/80 (abnormal) and his pulse is 69. His respiration is 18 and oxygen saturation is 97%. Physical Exam  Vitals signs and nursing note reviewed. Constitutional:       General: He is not in acute distress. Appearance: He is well-developed. He is not diaphoretic. HENT:      Head: Normocephalic and atraumatic. Right Ear: External ear normal.      Left Ear: External ear normal.      Nose: Nose normal.   Eyes:      General: Lids are normal. No scleral icterus. Right eye: No discharge. Left eye: No discharge. Conjunctiva/sclera: Conjunctivae normal.      Right eye: No exudate. Left eye: No exudate. Pupils: Pupils are equal, round, and reactive to light. Neck:      Musculoskeletal: Normal range of motion and neck supple. Normal range of motion. Thyroid: No thyromegaly. Vascular: No JVD. Trachea: No tracheal deviation. Cardiovascular:      Rate and Rhythm: Normal rate and regular rhythm.       Pulses: Normal pulses. Heart sounds: Normal heart sounds, S1 normal and S2 normal. No murmur. No friction rub. No gallop. Pulmonary:      Effort: Pulmonary effort is normal. No respiratory distress. Breath sounds: Normal breath sounds. No stridor. No wheezing or rales. Chest:      Chest wall: No tenderness. Abdominal:      General: Bowel sounds are normal. There is no distension. Palpations: Abdomen is soft. There is no mass. Tenderness: There is no abdominal tenderness. There is no right CVA tenderness, left CVA tenderness, guarding or rebound. Negative signs include Delaney's sign, Rovsing's sign, McBurney's sign, psoas sign and obturator sign. Comments: No pain with deep palpation. Musculoskeletal: Normal range of motion. General: No tenderness. Right shoulder: He exhibits no tenderness, no bony tenderness, no crepitus and normal strength. Lymphadenopathy:      Cervical: No cervical adenopathy. Skin:     General: Skin is warm and dry. Findings: No bruising, ecchymosis, lesion or rash. Neurological:      Mental Status: He is alert and oriented to person, place, and time. Cranial Nerves: No cranial nerve deficit. Sensory: No sensory deficit. Coordination: Coordination normal.      Deep Tendon Reflexes: Reflexes are normal and symmetric. Psychiatric:         Speech: Speech normal.         Behavior: Behavior normal.         Thought Content:  Thought content normal.         Judgment: Judgment normal.           DIFFERENTIAL DIAGNOSIS:   Differential diagnosis discussed extensively bedside with the patient including but not limited to uncontrolled hypertension accelerated hypertension medication noncompliance    DIAGNOSTIC RESULTS     EKG: All EKG's are interpreted by the Emergency Department Physician who either signs or Co-signs this chart in the absence of a cardiologist.  EKG reveals normal sinus rhythm, normal axis, ventricular rate of 73 [place] interval

## 2020-02-06 NOTE — ED NOTES
Pt returned from CT. Vitals updated. Will continue to monitor pt. Pt medicated and educated.         Miguel Jorge RN  02/06/20 [id number]

## 2020-02-10 ENCOUNTER — HOSPITAL ENCOUNTER (OUTPATIENT)
Dept: NUCLEAR MEDICINE | Age: 50
Discharge: HOME | End: 2020-02-10
Payer: COMMERCIAL

## 2020-02-10 VITALS — WEIGHT: 252 LBS | BODY MASS INDEX: 35.15 KG/M2

## 2020-02-10 PROCEDURE — A9537 TC99M MEBROFENIN: HCPCS | Performed by: SURGERY

## 2020-02-10 PROCEDURE — 3430000000 HC RX DIAGNOSTIC RADIOPHARMACEUTICAL: Performed by: SURGERY

## 2020-02-10 PROCEDURE — 78227 HEPATOBIL SYST IMAGE W/DRUG: CPT

## 2020-02-10 PROCEDURE — 2580000003 HC RX 258: Performed by: SURGERY

## 2020-02-10 PROCEDURE — 6360000002 HC RX W HCPCS: Performed by: SURGERY

## 2020-02-10 RX ADMIN — SODIUM CHLORIDE 2.29 MCG: 9 INJECTION, SOLUTION INTRAVENOUS at 08:22

## 2020-02-10 RX ADMIN — Medication 8.3 MILLICURIE: at 07:17

## 2020-02-10 NOTE — PROGRESS NOTES
Known Problems in his brother, brother, maternal grandfather, maternal grandmother, paternal grandfather, paternal grandmother, and sister. Social History   reports that he has never smoked. He has never used smokeless tobacco. He reports that he does not drink alcohol or use drugs. Health Screening Exams  Health Maintenance   Topic Date Due    DTaP/Tdap/Td vaccine (1 - Tdap) 11/20/1981    HIV screen  11/20/1985    Lipid screen  11/20/2010    Flu vaccine (1) 09/01/2019    Shingles Vaccine (1 of 2) 11/20/2020    Potassium monitoring  02/06/2021    Creatinine monitoring  02/06/2021    Hepatitis A vaccine  Aged Out    Hepatitis B vaccine  Aged Out    Hib vaccine  Aged Out    Meningococcal (ACWY) vaccine  Aged Out    Pneumococcal 0-64 years Vaccine  Aged Out     Review of Systems  Gen: denies fever, fatigue  Cardio: denies chest pain, palpiations  Resp: denies cough, SOB  GI: admits to constipation, denies N/V  : denies hematuria, dysuria  OBJECTIVE    VITALS:  height is 5' 11\" (1.803 m) and weight is 254 lb 9.6 oz (115.5 kg). His tympanic temperature is 96.8 °F (36 °C). His blood pressure is 170/65 (abnormal) and his pulse is 61. His respiration is 15 and oxygen saturation is 96%. Pain Score:   3 Body mass index is 35.51 kg/m². CONSTITUTIONAL: Alert and oriented times 3, no acute distress and cooperative to examination with proper mood and affect. SKIN: Skin color, texture, turgor normal. No rashes or lesions. LYMPH: no cervical nodes, no inguinal nodes  HEENT: Head is normocephalic, atraumatic. EOMI, PERRLA. NECK: Supple, symmetrical, trachea midline, no adenopathy, thyroid symmetric, not enlarged and no tenderness, skin normal.  CHEST/LUNGS: chest symmetric with normal A/P diameter, normal respiratory rate and rhythm, lungs clear to auscultation without wheezes, rales or rhonchi. No accessory muscle use.  Scars None   CARDIOVASCULAR: Heart sounds are normal.  Regular rate and rhythm without

## 2020-02-11 ENCOUNTER — OFFICE VISIT (OUTPATIENT)
Dept: SURGERY | Age: 50
End: 2020-02-11
Payer: COMMERCIAL

## 2020-02-11 VITALS
DIASTOLIC BLOOD PRESSURE: 65 MMHG | HEART RATE: 61 BPM | WEIGHT: 254.6 LBS | HEIGHT: 71 IN | SYSTOLIC BLOOD PRESSURE: 170 MMHG | TEMPERATURE: 96.8 F | RESPIRATION RATE: 15 BRPM | BODY MASS INDEX: 35.64 KG/M2 | OXYGEN SATURATION: 96 %

## 2020-02-11 PROCEDURE — 99212 OFFICE O/P EST SF 10 MIN: CPT | Performed by: SURGERY

## 2020-02-11 RX ORDER — CLONIDINE HYDROCHLORIDE 0.1 MG/1
0.1 TABLET ORAL
COMMUNITY
Start: 2020-02-06 | End: 2020-02-11

## 2020-02-11 NOTE — PROGRESS NOTES
Past Surgical History  Past Surgical History:   Procedure Laterality Date    CYST REMOVAL  1993    tailbone    OH COLON CA SCRN NOT  [address] IND N/A 10/15/2018    COLONOSCOPY performed by Roxann Meadows DO at [facility]     Medications  Current Outpatient Medications   Medication Sig Dispense Refill    cloNIDine (CATAPRES) 0.1 MG tablet Take 1 tablet by mouth as needed for High Blood Pressure Take if systolic blood pressure greater than 836 or diastolic blood pressure greater than 100 60 tablet 3    pantoprazole (PROTONIX) 40 MG tablet Take 1 tablet by mouth every morning (before breakfast) 30 tablet 0    lactulose (CEPHULAC) 10 g packet Take 1 packet by mouth 2 times daily for 7 days 14 packet 0    Ascorbic Acid (VITAMIN C) 1000 MG tablet Take 1,000 mg by mouth 2 times daily      potassium citrate (UROCIT-K) 10 MEQ (1080 MG) extended release tablet Take 10 mEq by mouth 3 times daily (with meals)      Cholecalciferol (VITAMIN D3) 50 MCG (2000 UT) CAPS Take by mouth      Multiple Vitamin (MULTI-VITAMIN DAILY PO) Take by mouth      allopurinol (ZYLOPRIM) 100 MG tablet Take 100 mg by mouth daily      metFORMIN (GLUCOPHAGE) 500 MG tablet Take 500 mg by mouth 2 times daily (with meals)      Azilsartan-Chlorthalidone (EDARBYCLOR) 40-25 MG TABS Take by mouth      amLODIPine (NORVASC) 5 MG tablet Take 10 mg by mouth daily       lamoTRIgine (LAMICTAL) 25 MG tablet Take 25 mg by mouth daily       No current facility-administered medications for this visit. Allergies  has No Known Allergies. Family History  family history includes Cancer in his maternal uncle and mother; Diabetes type 2  in his father; No Known Problems in his brother, brother, maternal grandfather, maternal grandmother, paternal grandfather, paternal grandmother, and sister. Social History   reports that he has never smoked. He has never used smokeless tobacco. He reports that he does not drink alcohol or use drugs.   Health Screening Exams  Health Maintenance   Topic Date Due    DTaP/Tdap/Td vaccine (1 - Tdap) 11/20/1981    HIV screen  11/20/1985    Lipid screen  11/20/2010    Flu vaccine (1) 09/01/2019    Shingles Vaccine (1 of 2) 11/20/2020    Potassium monitoring  02/06/2021    Creatinine monitoring  02/06/2021    Hepatitis A vaccine  Aged Out    Hepatitis B vaccine  Aged Out    Hib vaccine  Aged Out    Meningococcal (ACWY) vaccine  Aged Out    Pneumococcal 0-64 years Vaccine  Aged Out     Review of Systems  History obtained from the patient. Constitutional: Denies any fever, chills. Derm: Denies any rash or skin color change. Eyes: Denies blurred or decreased in vision. Ent: Denies any tinnitus or vertigo. Resp: Denies any cough or shortness of breath. CV: Denies any syncope, palpitations or chest pain. GI:  Admits to abdominal pain and constipation, denies nausea, vomiting or diarrhea. : Denies any hematuria, hesitancy, or dysuria. Heme/Lymph: Denies any bleeding. Musculoskeletal: Denies any myalgias, muscle weakness or neck pain. Neuro: Denies any dizziness, paresthesia or weakness. OBJECTIVE      VITALS:  height is 5' 11\" (1.803 m) and weight is 254 lb 9.6 oz (115.5 kg). His tympanic temperature is 96.8 °F (36 °C). His blood pressure is 170/65 (abnormal) and his pulse is 61. His respiration is 15 and oxygen saturation is 96%. Pain Score:   3 Body mass index is 35.51 kg/m². CONSTITUTIONAL: Alert and oriented times 3, no acute distress and cooperative to examination with proper mood and affect. SKIN: Skin color, texture, turgor normal. No rashes or lesions. LYMPH: no cervical nodes, no inguinal nodes  HEENT: Head is normocephalic, atraumatic. EOMI, PERRLA.   NECK: Supple, symmetrical, trachea midline, no adenopathy, thyroid symmetric, not enlarged and no tenderness, skin normal.  CHEST/LUNGS: chest symmetric with normal A/P diameter, normal respiratory rate and rhythm, lungs clear to auscultation

## 2020-02-11 NOTE — LETTER
[place]  [address] [facility]. [address]  [facility] [address]  [phone]     Pt Name: Samanta Kellogg  Medical Record Number: [id number]  Date of Birth 1970   Today's Date: 2/11/2020    Adeola Washington was evaluated in the office today. My assessment and plans are listed below. ASSESSMENT      Diagnosis Orders   1. RUQ abdominal pain       Past Medical History:   Diagnosis Date    Anxiety     Arthritis     Depression     Diabetes ([facility] [address]     Hyperlipidemia     Hypertension          PLANS:   1. HIDA scan failed to reproduce his symptoms, unlikely to be gallbladder related and more likely related to his ongoing constipation issues. 2. Laxative use discussed  3. Follow up as needed. If I can provide any additional assistance or you have any concerns, please feel free to contact me. Thank you for allowing to participate in the care of your patients. Sincerely,      Sidra Woodall.  Birdie Chambers

## 2020-02-20 ENCOUNTER — OFFICE VISIT (OUTPATIENT)
Dept: CARDIOLOGY CLINIC | Age: 50
End: 2020-02-20
Payer: COMMERCIAL

## 2020-02-20 VITALS
WEIGHT: 253.4 LBS | HEIGHT: 72 IN | DIASTOLIC BLOOD PRESSURE: 74 MMHG | SYSTOLIC BLOOD PRESSURE: 122 MMHG | BODY MASS INDEX: 34.32 KG/M2 | HEART RATE: 74 BPM

## 2020-02-20 PROCEDURE — 93000 ELECTROCARDIOGRAM COMPLETE: CPT | Performed by: INTERNAL MEDICINE

## 2020-02-20 PROCEDURE — 99204 OFFICE O/P NEW MOD 45 MIN: CPT | Performed by: INTERNAL MEDICINE

## 2020-02-20 RX ORDER — LISINOPRIL 2.5 MG/1
TABLET ORAL
COMMUNITY
End: 2020-05-14 | Stop reason: ALTCHOICE

## 2020-02-20 NOTE — PROGRESS NOTES
JAMESON, referred by Dr. Courtney Donya office. They were unable to complete the colonoscopy. He denies having any chest pain, SOB, dizziness or lightheadedness. He has had intermittent palpitations. EKG completed.

## 2020-02-20 NOTE — PROGRESS NOTES
[address] [facility] [address] [place] [address]  Dept: [phone]  Dept Fax: [phone]  Loc: [phone]    Visit Date: 2/20/2020    Mr. Kalyan Sawyer is a 52 y.o. male  who presented for:  Chief Complaint   Patient presents with    New Patient    Tachycardia    Palpitations       HPI:   51 yo M c hx DM, HTN, HLD and Anxiety is referred from GI clinic for PVCs. Patient was scheduled for EGD today and GI team noted significant PVCs. Patient reports some dyspnea and fatigue lately. These symptoms have been there since October. Patient does report lately he changed his diet and lost 40 lbs. He does not intermittent palpitations and skipping of the beats.           Current Outpatient Medications:     lisinopril (PRINIVIL;ZESTRIL) 2.5 MG tablet, , Disp: , Rfl:     cloNIDine (CATAPRES) 0.1 MG tablet, Take 1 tablet by mouth as needed for High Blood Pressure Take if systolic blood pressure greater than 562 or diastolic blood pressure greater than 100, Disp: 60 tablet, Rfl: 3    pantoprazole (PROTONIX) 40 MG tablet, Take 1 tablet by mouth every morning (before breakfast), Disp: 30 tablet, Rfl: 0    Ascorbic Acid (VITAMIN C) 1000 MG tablet, Take 1,000 mg by mouth 2 times daily, Disp: , Rfl:     potassium citrate (UROCIT-K) 10 MEQ (1080 MG) extended release tablet, Take 10 mEq by mouth 3 times daily (with meals), Disp: , Rfl:     Cholecalciferol (VITAMIN D3) 50 MCG (2000 UT) CAPS, Take by mouth, Disp: , Rfl:     Multiple Vitamin (MULTI-VITAMIN DAILY PO), Take by mouth, Disp: , Rfl:     allopurinol (ZYLOPRIM) 100 MG tablet, Take 100 mg by mouth daily, Disp: , Rfl:     metFORMIN (GLUCOPHAGE) 500 MG tablet, Take 500 mg by mouth 2 times daily (with meals), Disp: , Rfl:     Azilsartan-Chlorthalidone (EDARBYCLOR) 40-25 MG TABS, Take by mouth, Disp: , Rfl:     amLODIPine (NORVASC) 5 MG tablet, Take 10 mg by mouth daily , Disp: , Rfl:    EXAM  Constitutional: Oriented to person, place, and time. Appears well-developed and well-nourished. HENT:   Head: Normocephalic and atraumatic. Eyes: EOM are normal. Pupils are equal, round, and reactive to light. Neck: Normal range of motion. Neck supple. No JVD present. Cardiovascular: Normal rate , normal heart sounds and intact distal pulses. Pulmonary/Chest: Effort normal and breath sounds normal. No respiratory distress. No wheezes. No rales. Abdominal: Soft. Bowel sounds are normal. No distension. There is no tenderness. Musculoskeletal: Normal range of motion. No edema. Neurological: Alert and oriented to person, place, and time. No cranial nerve deficit. Coordination normal.   Skin: Skin is warm and dry. Psychiatric: Normal mood and affect.        No results found for: CKTOTAL, CKMB, CKMBINDEX    Lab Results   Component Value Date    WBC 10.7 02/06/2020    RBC 5.32 02/06/2020    HGB 16.1 02/06/2020    HCT 48.6 02/06/2020    MCV 91.4 02/06/2020    MCH 30.3 02/06/2020    MCHC 33.1 02/06/2020     02/06/2020    MPV 10.1 02/06/2020       Lab Results   Component Value Date     02/06/2020    K 3.4 02/06/2020     02/06/2020    CO2 28 02/06/2020    BUN 12 02/06/2020    LABALBU 4.6 02/06/2020    CREATININE 1.0 02/06/2020    CALCIUM 10.0 02/06/2020    LABGLOM 79 02/06/2020    GLUCOSE 97 02/06/2020       Lab Results   Component Value Date    ALKPHOS 33 02/06/2020    ALT 42 02/06/2020    AST 30 02/06/2020    PROT 7.5 02/06/2020    BILITOT 0.3 02/06/2020    BILIDIR <0.2 02/06/2020    LABALBU 4.6 02/06/2020       Lab Results   Component Value Date    MG 2.0 02/06/2020       No results found for: INR, PROTIME      No results found for: LABA1C    No results found for: TRIG, HDL, LDLCALC, LDLDIRECT, LABVLDL    Lab Results   Component Value Date    TSH 3.300 02/06/2020         Testing Reviewed:      I haveindividually reviewed the below cardiac tests    EKG:    ECHO: No results found for

## 2020-02-25 ENCOUNTER — TELEPHONE (OUTPATIENT)
Dept: CARDIOLOGY CLINIC | Age: 50
End: 2020-02-25

## 2020-02-25 NOTE — TELEPHONE ENCOUNTER
Rayshawn Olsen at Dr. Reyes Flu office called regarding a clearance.  [address] [facility] is waiting on that, patient needs an EGD. She stated she left a message on voice mail yesterday and has not heard back yet. Please advise.

## 2020-03-09 ENCOUNTER — HOSPITAL ENCOUNTER (OUTPATIENT)
Dept: NON INVASIVE DIAGNOSTICS | Age: 50
Discharge: HOME | End: 2020-03-09
Payer: COMMERCIAL

## 2020-03-09 VITALS — BODY MASS INDEX: 34.54 KG/M2 | HEIGHT: 72 IN | WEIGHT: 255 LBS

## 2020-03-09 LAB
LV EF: 53 %
LV EF: 58 %
LVEF MODALITY: NORMAL
LVEF MODALITY: NORMAL

## 2020-03-09 PROCEDURE — 93017 CV STRESS TEST TRACING ONLY: CPT

## 2020-03-09 PROCEDURE — A9500 TC99M SESTAMIBI: HCPCS | Performed by: INTERNAL MEDICINE

## 2020-03-09 PROCEDURE — 3430000000 HC RX DIAGNOSTIC RADIOPHARMACEUTICAL: Performed by: INTERNAL MEDICINE

## 2020-03-09 PROCEDURE — 78452 HT MUSCLE IMAGE SPECT MULT: CPT

## 2020-03-09 PROCEDURE — 93306 TTE W/DOPPLER COMPLETE: CPT

## 2020-03-09 PROCEDURE — 93226 XTRNL ECG REC<48 HR SCAN A/R: CPT

## 2020-03-09 PROCEDURE — 93225 XTRNL ECG REC<48 HRS REC: CPT

## 2020-03-09 RX ADMIN — Medication 33.6 MILLICURIE: at 09:42

## 2020-03-09 RX ADMIN — Medication 9.3 MILLICURIE: at 08:41

## 2020-03-17 LAB
ACQUISITION DURATION: NORMAL S
AVERAGE HEART RATE: 75 BPM
HOOKUP DATE: NORMAL
HOOKUP TIME: NORMAL
MAX HEART RATE TIME/DATE: NORMAL
MAX HEART RATE: 131 BPM
MIN HEART RATE TIME/DATE: NORMAL
MIN HEART RATE: 47 BPM
NUMBER OF QRS COMPLEXES: NORMAL
NUMBER OF SUPRAVENTRICULAR BEATS IN RUNS: 0
NUMBER OF SUPRAVENTRICULAR COUPLETS: 0
NUMBER OF SUPRAVENTRICULAR ECTOPICS: 11
NUMBER OF SUPRAVENTRICULAR ISOLATED BEATS: 11
NUMBER OF SUPRAVENTRICULAR RUNS: 0
NUMBER OF VENTRICULAR BEATS IN RUNS: 0
NUMBER OF VENTRICULAR BIGEMINAL CYCLES: 157
NUMBER OF VENTRICULAR COUPLETS: 6
NUMBER OF VENTRICULAR ECTOPICS: NORMAL
NUMBER OF VENTRICULAR ISOLATED BEATS: NORMAL
NUMBER OF VENTRICULAR RUNS: 0

## 2020-03-18 ENCOUNTER — TELEPHONE (OUTPATIENT)
Dept: CARDIOLOGY CLINIC | Age: 50
End: 2020-03-18

## 2020-03-18 NOTE — TELEPHONE ENCOUNTER
Dr Vicky Villafana reviewed Event Monitor, Stress Test and ECHO. Pre op form signed by Dr Vicky Villafana. Faxed and scanned. Pt notified.

## 2020-05-14 ENCOUNTER — OFFICE VISIT (OUTPATIENT)
Dept: CARDIOLOGY CLINIC | Age: 50
End: 2020-05-14
Payer: COMMERCIAL

## 2020-05-14 ENCOUNTER — TELEPHONE (OUTPATIENT)
Dept: SURGERY | Age: 50
End: 2020-05-14

## 2020-05-14 ENCOUNTER — OFFICE VISIT (OUTPATIENT)
Dept: SURGERY | Age: 50
End: 2020-05-14
Payer: COMMERCIAL

## 2020-05-14 VITALS
SYSTOLIC BLOOD PRESSURE: 120 MMHG | BODY MASS INDEX: 34.25 KG/M2 | TEMPERATURE: 97 F | RESPIRATION RATE: 18 BRPM | WEIGHT: 252.9 LBS | HEART RATE: 82 BPM | HEIGHT: 72 IN | OXYGEN SATURATION: 96 % | DIASTOLIC BLOOD PRESSURE: 82 MMHG

## 2020-05-14 VITALS
BODY MASS INDEX: 34.24 KG/M2 | HEIGHT: 72 IN | DIASTOLIC BLOOD PRESSURE: 72 MMHG | HEART RATE: 76 BPM | WEIGHT: 252.8 LBS | SYSTOLIC BLOOD PRESSURE: 142 MMHG

## 2020-05-14 PROCEDURE — 99213 OFFICE O/P EST LOW 20 MIN: CPT | Performed by: INTERNAL MEDICINE

## 2020-05-14 PROCEDURE — 99203 OFFICE O/P NEW LOW 30 MIN: CPT | Performed by: SURGERY

## 2020-05-14 RX ORDER — CELECOXIB 100 MG/1
100 CAPSULE ORAL PRN
COMMUNITY
End: 2021-05-13

## 2020-05-14 NOTE — PROGRESS NOTES
620 [facility] [address] [place] [address]  Dept: [phone]  Dept Fax: [phone]  Loc: [phone]    Visit Date: 5/14/2020    Mr. Merlin Sanabria is a 52 y.o. male  who presented for:  Chief Complaint   Patient presents with    Follow-up       HPI:   53 yo M c hx DM, HTN, HLD and Anxiety is referred from GI clinic for PVCs. Patient was scheduled for EGD today and GI team noted significant PVCs. Patient reports some dyspnea and fatigue lately. These symptoms have been there since October. Patient underwent Echo and stress test and holter monitor. He is here for a follow up.        Current Outpatient Medications:     lisinopril (PRINIVIL;ZESTRIL) 2.5 MG tablet, , Disp: , Rfl:     cloNIDine (CATAPRES) 0.1 MG tablet, Take 1 tablet by mouth as needed for High Blood Pressure Take if systolic blood pressure greater than 559 or diastolic blood pressure greater than 100, Disp: 60 tablet, Rfl: 3    pantoprazole (PROTONIX) 40 MG tablet, Take 1 tablet by mouth every morning (before breakfast), Disp: 30 tablet, Rfl: 0    Ascorbic Acid (VITAMIN C) 1000 MG tablet, Take 1,000 mg by mouth 2 times daily, Disp: , Rfl:     potassium citrate (UROCIT-K) 10 MEQ (1080 MG) extended release tablet, Take 10 mEq by mouth 3 times daily (with meals), Disp: , Rfl:     Cholecalciferol (VITAMIN D3) 50 MCG (2000 UT) CAPS, Take by mouth, Disp: , Rfl:     Multiple Vitamin (MULTI-VITAMIN DAILY PO), Take by mouth, Disp: , Rfl:     allopurinol (ZYLOPRIM) 100 MG tablet, Take 100 mg by mouth daily, Disp: , Rfl:     metFORMIN (GLUCOPHAGE) 500 MG tablet, Take 500 mg by mouth 2 times daily (with meals), Disp: , Rfl:     Azilsartan-Chlorthalidone (EDARBYCLOR) 40-25 MG TABS, Take by mouth, Disp: , Rfl:     amLODIPine (NORVASC) 5 MG tablet, Take 10 mg by mouth daily , Disp: , Rfl:     lamoTRIgine (LAMICTAL) 25 MG tablet, Take 25 mg by mouth daily, Disp: , Rfl:     Past Medical History  Humberto Miller  has a past medical history of Anxiety, Arthritis, Depression, Diabetes ([address] Hyperlipidemia, and Hypertension. Social History  Humberto Miller  reports that he has never smoked. He has never used smokeless tobacco. He reports that he does not drink alcohol or use drugs. Family History  Humberto Miller family history includes Cancer in his maternal uncle and mother; Diabetes type 2  in his father; No Known Problems in his brother, brother, maternal grandfather, maternal grandmother, paternal grandfather, paternal grandmother, and sister. Past Surgical History   Past Surgical History:   Procedure Laterality Date    CYST REMOVAL  1993    tailbone    [place] COLON CA SCRN NOT  [address] IND N/A 10/15/2018    COLONOSCOPY performed by Edie Landa DO at [facility]       Subjective:     REVIEW OF SYSTEMS  Constitutional: denies sweats, chills and fever  HENT: denies  congestion, sinus pressure, sneezing and sore throat. Eyes: denies  pain, discharge, redness and itching. Respiratory: denies apnea, cough  Gastrointestinal: denies blood in stool, constipation, diarrhea   Endocrine: denies cold intolerance, heat intolerance, polydipsia. Genitourinary: denies dysuria, enuresis, flank pain and hematuria. Musculoskeletal: denies arthralgias, joint swelling and neck pain. Neurological: denies numbness and headaches. Psychiatric/Behavioral: denies agitation, confusion, decreased concentration and dysphoric mood    All others reviewed and are negative. Objective:     BP (!) 156/82   Pulse 76   Ht 6' (1.829 m)   Wt 252 lb 12.8 oz (114.7 kg)   BMI 34.29 kg/m²     Wt Readings from Last 3 Encounters:   05/14/20 252 lb 12.8 oz (114.7 kg)   03/09/20 255 lb (115.7 kg)   02/20/20 253 lb 6.4 oz (114.9 kg)     BP Readings from Last 3 Encounters:   05/14/20 (!) 156/82   02/20/20 122/74   02/11/20 (!) 170/65       PHYSICAL EXAM  Constitutional: Oriented to person, place, and time.  Appears well-developed and Orders   1. PVC (premature ventricular contraction)       PVCs  Palpitations  DM  HTN  HLD  Obesity  Anxiety    Reviewed 48 hr holter, Echo and Lexiscan stress test  Labs are within normal results  Follow up after testing  The patient is asked to make an attempt to improve diet and exercise patterns to aid in medical management of this problem. Advised more plant based nutrition/meditarrean diet   Advised patient to call office or seek immediate medical attention if there is any new onset of  any chest pain, sob, palpitations, lightheadedness, dizziness, orthopnea, PND or pedal edema. All medication side effects were discussed in details. Thank youfor allowing me to participate in the care of this patient. Please do not hesitate to contact me for any further questions. Return in about 1 year (around 5/14/2021) for Regular follow up, Review testing.        Electronically signed by Je Soto MD [facility]  5/14/2020 at 12:55 PM

## 2020-05-14 NOTE — PROGRESS NOTES
701 [facility]  [address] [facility] [address]  Dept: [phone]  Dept Fax: [phone]  Loc: [phone]    Visit Date: 5/14/2020    Nadja Owens is a 52 y.o. male who presentstoday for:  Chief Complaint   Patient presents with   [facility] Surgical Consult     new patient--self ref 2nd opinion for cholelithiasis-saw Dr. Denita Booker 2/11/20       HPI:     HPI 27-year-old white male who has a history of bowel problems mainly constipation who has to take laxatives to keep his stools regular and soft he clearly has constipation issues when he gets abdominal bloating been having symptoms of biliary colic with right upper quadrant pain also pain goes around the side to his back work-up revealed an ultrasound showing cholelithiasis and a HIDA scan showing 29% ejection fraction he had seen my associate Dr. Denita Booker in February of this year who felt that it was likely not biliary colic as his HIDA scan did not reproduce his symptoms however he is continuing with right upper quadrant pain and was referred for my evaluation he has had no weight loss he did have a colonoscopy in 2018 patient is also diabetic    Past Medical History:   Diagnosis Date    Anxiety     Arthritis     Depression     Diabetes ([address]     Hyperlipidemia     Hypertension     sees Dr Marga Jaime      Past Surgical History:   Procedure Laterality Date    CYST REMOVAL  1993    tailbone    [place] COLON CA SCRN NOT  [address] [facility] N/A 10/15/2018    COLONOSCOPY performed by Luis Erazo DO at [facility]        Family History   Problem Relation Age of Onset    Cancer Mother     Cancer Maternal Uncle         colon    Diabetes type 2  Father     No Known Problems Sister     No Known Problems Brother     No Known Problems Maternal Grandmother     No Known Problems Maternal Grandfather     No Known Problems Paternal Grandmother     No Known Problems Paternal Grandfather     No Known Pupils: Pupils are equal, round, and reactive to light. Neck:      Thyroid: No thyromegaly. Trachea: No tracheal deviation. Cardiovascular:      Rate and Rhythm: Normal rate and regular rhythm. Heart sounds: Normal heart sounds. No murmur. No friction rub. No gallop. Pulmonary:      Effort: Pulmonary effort is normal. No respiratory distress. Breath sounds: Normal breath sounds. No wheezing or rales. Chest:      Chest wall: No tenderness. Abdominal:      Tenderness: There is abdominal tenderness. Comments: She does have some mild tenderness right upper quadrant   Musculoskeletal: Normal range of motion. General: No tenderness. Lymphadenopathy:      Cervical: No cervical adenopathy. Skin:     General: Skin is warm and dry. Coloration: Skin is not pale. Findings: No erythema or rash. Neurological:      Mental Status: He is alert and oriented to person, place, and time. Psychiatric:         Behavior: Behavior normal.         Thought Content:  Thought content normal.         Judgment: Judgment normal.            Results for orders placed or performed during the hospital encounter of 03/09/20   Holter monitor 48 hour   Result Value Ref Range    Hookup Date [id number]     Hookup Time [id number]     Acquisition Duration 590862 S    Number Of QRS Complexes 291707     Number Of Ventricular Ectopics 91728     Number Of Ventricular Isolated Beats 33355     Number Of Ventricular Bigeminal Cycles 157     Number Of Ventricular Couplets 6     Number Of Ventricular Runs 0     Number Of Ventricular Beats In Runs 0     Number Of Superventricular Ectopics 11     Number Of Suptaventricular Isolated Beats 11     Number Of Supraventricular Couplets 0     Number Of Supraventricular Runs 0     Number Of Supraventricular Beats In Runs 0     Average Heart Rate 75 BPM    Max Heart Rate 131 BPM    Min Heart Rate 47 BPM    Max Heart Rate Time/Date [id number]     Min Heart Rate Time/Date [id number]        Assessment:     Right upper quadrant pain which is somewhat reminiscent of biliary colic although a little more lateral than would be expected certainly he does have constipation issues but as long as he takes his laxatives apparently that controls it he clearly has gallbladder disease given he has positive gallstones and a HIDA scan that was abnormal in my experience certainly undergoing a HIDA scan can reproduce symptoms not all of the time given the fact that he is diabetic and diabetics tend to have more problems with the gallbladder I feel would be reasonable to have a laparoscopic cholecystectomy performed we did indicate to him the potential that this could be constipation and it may not be of benefit however he clearly has a disease gallbladder we discussed the laparoscopic procedure and the risks of need for open due to common bile duct injury and bleeding he would like to proceed    Plan:     1. Schedule Jerrod for scopic cholecystectomy possible open 2. The risks, benefits and alternatives were discussed with Shila Motley. He understands and wishes to proceed with surgical intervention. 3. Restrictions discussed with Shila Motley and he expresses understanding. 4. He is advised to call back directly if there are further questions/concerns, or if his symptoms worsen prior to surgery.             Electronicallysigned by Duncan Thornton MD on 5/14/2020 at 1:09 PM

## 2020-05-18 ASSESSMENT — ENCOUNTER SYMPTOMS
CHOKING: 0
CONSTIPATION: 1
CHEST TIGHTNESS: 0
RHINORRHEA: 0
STRIDOR: 0
TROUBLE SWALLOWING: 0
EYE DISCHARGE: 0
SINUS PAIN: 0
PHOTOPHOBIA: 0
VOICE CHANGE: 0
EYE PAIN: 0
SHORTNESS OF BREATH: 0
ABDOMINAL DISTENTION: 1
ALLERGIC/IMMUNOLOGIC NEGATIVE: 1
WHEEZING: 0
BACK PAIN: 0
COLOR CHANGE: 0
EYE ITCHING: 1
APNEA: 0
EYE REDNESS: 0
SORE THROAT: 0
COUGH: 0
SINUS PRESSURE: 0
FACIAL SWELLING: 0

## 2020-05-27 ENCOUNTER — TELEPHONE (OUTPATIENT)
Dept: SURGERY | Age: 50
End: 2020-05-27

## 2020-05-27 NOTE — TELEPHONE ENCOUNTER
Scheduled Brian Dent for an egd & lap erik at [facility] on Wed 6/17 at 10am & [address] will be npo after midnight & orders were given for a cbc & bmp which he will have done at [facility]. Dr Lor Gonzalez has cleared Brian Dent & everything was faxed to [facility].

## 2020-06-09 LAB
ABSOLUTE BASO #: 0.1 X10E9/L (ref 0–0.9)
ABSOLUTE EOS #: 0.2 X10E9/L (ref 0–0.4)
ABSOLUTE LYMPH #: 2.6 X10E9/L (ref 1–3.5)
ABSOLUTE MONO #: 0.6 X10E9/L (ref 0–0.9)
ABSOLUTE NEUT #: 4.3 X10E9/L (ref 1.5–6.6)
ANION GAP SERPL CALCULATED.3IONS-SCNC: 10 MMOL/L (ref 5–15)
BASOPHILS RELATIVE PERCENT: 1.2 %
BUN BLDV-MCNC: 16 MG/DL (ref 5–23)
CALCIUM SERPL-MCNC: 9.4 MG/DL (ref 8.5–10.5)
CHLORIDE BLD-SCNC: 105 MMOL/L (ref 98–109)
CO2: 27 MMOL/L (ref 22–32)
CREAT SERPL-MCNC: 1.06 MG/DL (ref 0.6–1.3)
EGFR AFRICAN AMERICAN: >60 ML/MIN/1.73SQ.M
EGFR IF NONAFRICAN AMERICAN: >60 ML/MIN/1.73SQ.M
EOSINOPHILS RELATIVE PERCENT: 2.1 %
GLUCOSE: 101 MG/DL (ref 65–99)
HCT VFR BLD CALC: 44.5 % (ref 39–49)
HEMOGLOBIN: 15 G/DL (ref 13.1–17.3)
LYMPHOCYTE %: 33.2 %
MCH RBC QN AUTO: 30.9 PG (ref 27–35)
MCHC RBC AUTO-ENTMCNC: 33.8 G/DL (ref 32–36)
MCV RBC AUTO: 92 FL (ref 81–101)
MONOCYTES # BLD: 7.8 %
NEUTROPHILS RELATIVE PERCENT: 55.7 %
PDW BLD-RTO: 13.2 % (ref 11.4–14.3)
PLATELETS: 216 X10E9/L (ref 150–450)
PMV BLD AUTO: 9 FL (ref 7–12)
POTASSIUM SERPL-SCNC: 4 MMOL/L (ref 3.5–5)
RBC: 4.86 X10E12/L (ref 4.25–5.65)
SODIUM BLD-SCNC: 142 MMOL/L (ref 134–146)
WBC: 7.7 X10E9/L (ref 4.8–10.8)

## 2020-07-02 ENCOUNTER — OFFICE VISIT (OUTPATIENT)
Dept: SURGERY | Age: 50
End: 2020-07-02

## 2020-07-02 VITALS
TEMPERATURE: 97.6 F | RESPIRATION RATE: 14 BRPM | OXYGEN SATURATION: 98 % | DIASTOLIC BLOOD PRESSURE: 75 MMHG | WEIGHT: 252.87 LBS | HEIGHT: 72 IN | BODY MASS INDEX: 34.25 KG/M2 | HEART RATE: 75 BPM | SYSTOLIC BLOOD PRESSURE: 122 MMHG

## 2020-07-02 PROCEDURE — 99024 POSTOP FOLLOW-UP VISIT: CPT | Performed by: SURGERY

## 2020-07-02 NOTE — PROGRESS NOTES
[address] [address]  Dept: [phone]  Dept Fax: [phone]  Loc: [phone]    Visit Date: 7/2/2020    Cynthia Alan is a 52 y.o. male who presentstoday for:  Chief Complaint   Patient presents with    Post-Op Check     s/p 6/17/20 1. Lap erik with cholangiogram 2. Esophagogastroduodenoscopy 3.  Excision of 2cm chest wall mass       HPI:     HPI patient here for check post laparoscopic cholecystectomy EGD and excision of sebaceous cyst this was performed at [facility] patient did develop bruising of the right lower abdominal wound that did travel down to the scrotum patient states preoperative symptoms are gone he still having some abdominal wall pain EGD was negative for any findings and the mass removed was a sebaceous cyst    Past Medical History:   Diagnosis Date    Anxiety     Arthritis     Depression     Diabetes ([address]     Hyperlipidemia     Hypertension     sees Dr Alesha Pickering      Past Surgical History:   Procedure Laterality Date    CHOLECYSTECTOMY  06/17/2020    [facility]ix[facility]rosalie    CYST REMOVAL  1993    tailbone    CYST REMOVAL  06/17/2020    [facility]    [place] COLON CA SCRN NOT  [address] IND N/A 10/15/2018    COLONOSCOPY performed by Patrick Howe DO at [facility]    UPPER GASTROINTESTINAL ENDOSCOPY  06/17/2020    [facility]        Family History   Problem Relation Age of Onset    Cancer Mother     Cancer Maternal Uncle         colon    Diabetes type 2  Father     No Known Problems Sister     No Known Problems Brother     No Known Problems Maternal Grandmother     No Known Problems Maternal Grandfather     No Known Problems Paternal Grandmother     No Known Problems Paternal Grandfather     No Known Problems Brother         Social History     Tobacco Use    Smoking status: Never Smoker    Smokeless tobacco: Never Used   Substance Use Topics    Alcohol use: No          Current Outpatient Medications   Medication Sig Dispense Refill    Ascorbic Acid (VITAMIN C) 1000 MG tablet Take 1,000 mg by mouth 2 times daily      potassium citrate (UROCIT-K) 10 MEQ (1080 MG) extended release tablet Take 10 mEq by mouth 3 times daily (with meals)      Cholecalciferol (VITAMIN D3) 50 MCG (2000 UT) CAPS Take by mouth      Multiple Vitamin (MULTI-VITAMIN DAILY PO) Take by mouth      allopurinol (ZYLOPRIM) 100 MG tablet Take 100 mg by mouth daily      metFORMIN (GLUCOPHAGE) 500 MG tablet Take 500 mg by mouth 2 times daily (with meals)      Azilsartan-Chlorthalidone (EDARBYCLOR) 40-25 MG TABS Take by mouth      amLODIPine (NORVASC) 5 MG tablet Take 10 mg by mouth daily       lamoTRIgine (LAMICTAL) 25 MG tablet Take 25 mg by mouth daily      celecoxib (CELEBREX) 100 MG capsule Take 100 mg by mouth as needed for Pain      cloNIDine (CATAPRES) 0.1 MG tablet Take 1 tablet by mouth as needed for High Blood Pressure Take if systolic blood pressure greater than 388 or diastolic blood pressure greater than 100 (Patient not taking: Reported on 7/2/2020) 60 tablet 3    pantoprazole (PROTONIX) 40 MG tablet Take 1 tablet by mouth every morning (before breakfast) (Patient not taking: Reported on 7/2/2020) 30 tablet 0     No current facility-administered medications for this visit.       No Known Allergies    Subjective:      Review of Systems    Objective:   /75 (Site: Right Upper Arm, Position: Sitting, Cuff Size: Medium Adult)   Pulse 75   Temp 97.6 °F (36.4 °C) (Tympanic)   Resp 14   Ht 5' 11.65\" (1.82 m)   Wt 252 lb 13.9 oz (114.7 kg)   SpO2 98%   BMI 34.63 kg/m²     Physical Exam look good is has some bruising of the right lower abdominal wall       Results for orders placed or performed in visit on 24/36/58   Basic Metabolic Panel   Result Value Ref Range    Glucose 101 (H) 65 - 99 mg/dL    BUN 16 5 - 23 mg/dL    CREATININE 1.06 0.60 - 1.30 mg/dL    eGFR African American >60 >59 ml/min/1.73sq.m    EGFR IF NonAfrican American >60 >59 ml/min/1.73sq. m    Calcium 9.4 8.5 - 10.5 mg/dL    Sodium 142 134 - 146 mmol/L    Potassium 4.0 3.5 - 5.0 mmol/L    Chloride 105 98 - 109 mmol/L    CO2 27 22 - 32 mmol/L    Anion Gap 10 5 - 15 mmol/L   CBC   Result Value Ref Range    WBC 7.7 4.8 - 10.8 X10E9/L    RBC 4.86 4.25 - 5.65 X10E12/L    Hemoglobin 15.0 13.1 - 17.3 g/dL    Hematocrit 44.5 39 - 49 %    MCV 92 81 - 101 fL    MCH 30.9 27 - 35 pg    MCHC 33.8 32 - 36 g/dL    RDW 13.2 11.4 - 14.3 %    Platelets 889 601 - 879 X10E9/L    MPV 9.0 7 - 12 fL    Neutrophils % 55.7 %    Absolute Neut # 4.3 1.5 - 6.6 X10E9/L    Lymphocyte % 33.2 %    Absolute Lymph # 2.6 1.0 - 3.5 X10E9/L    Monocytes 7.8 %    Absolute Mono # 0.6 0 - 0.9 X10E9/L    Eosinophils % 2.1 %    Absolute Eos # 0.2 0.0 - 0.4 X10E9/L    Basophils % 1.2 %    Absolute Baso # 0.1 0 - 0.9 X10E9/L       Assessment:     Discussed the pathology with patient that did show cholelithiasis and chronic cholecystitis pattie the normal EGD and the pathology on the sebaceous cyst    Plan:     Turn to office as needed      Electronicallysigned by Belkis Malave MD on 7/2/2020 at 10:26 AM

## 2021-05-13 ENCOUNTER — OFFICE VISIT (OUTPATIENT)
Dept: CARDIOLOGY CLINIC | Age: 51
End: 2021-05-13
Payer: COMMERCIAL

## 2021-05-13 VITALS
DIASTOLIC BLOOD PRESSURE: 80 MMHG | HEART RATE: 69 BPM | BODY MASS INDEX: 38.36 KG/M2 | WEIGHT: 274 LBS | HEIGHT: 71 IN | SYSTOLIC BLOOD PRESSURE: 144 MMHG

## 2021-05-13 DIAGNOSIS — I49.3 PVC (PREMATURE VENTRICULAR CONTRACTION): Primary | ICD-10-CM

## 2021-05-13 PROCEDURE — 99214 OFFICE O/P EST MOD 30 MIN: CPT | Performed by: INTERNAL MEDICINE

## 2021-05-13 PROCEDURE — 93000 ELECTROCARDIOGRAM COMPLETE: CPT | Performed by: INTERNAL MEDICINE

## 2021-05-13 RX ORDER — ATORVASTATIN CALCIUM 10 MG/1
10 TABLET, FILM COATED ORAL DAILY
COMMUNITY

## 2021-05-13 RX ORDER — CHLORTHALIDONE 25 MG/1
50 TABLET ORAL DAILY
COMMUNITY

## 2021-05-13 NOTE — PROGRESS NOTES
Pt here for 1 yr check up    EKG done today    Pt concerned b/p running high     Pt continues to have issues having bowel movements  tried changing some meds

## 2021-05-13 NOTE — PROGRESS NOTES
[address] [facility],[place] [address] [address] [place] [address]  Dept: [phone]  Dept Fax: [phone]  Loc: [phone]    Visit Date: 5/13/2021    Mr. Favian Mcintosh is a 48 y.o. male  who presented for:  Chief Complaint   Patient presents with    Check-Up       HPI:   49 yo M c hx DM, HTN, HLD and Anxiety is referred from GI clinic for PVCs. Patient was scheduled for EGD today and GI team noted significant PVCs. Patient reports some dyspnea and fatigue lately. These symptoms have been there since October. Patient underwent Echo and stress test and holter monitor. He is here for a follow up.        Current Outpatient Medications:     NONFORMULARY, 80 mg nightly edbari, Disp: , Rfl:     NONFORMULARY, 500 mg daily nabumetone, Disp: , Rfl:     atorvastatin (LIPITOR) 10 MG tablet, Take 10 mg by mouth daily, Disp: , Rfl:     Potassium 99 MG TABS, Take 99 mg by mouth 2 times daily, Disp: , Rfl:     chlorthalidone (HYGROTON) 25 MG tablet, Take 50 mg by mouth daily , Disp: , Rfl:     NONFORMULARY, daily Stool softner, Disp: , Rfl:     NONFORMULARY, daily gas, Disp: , Rfl:     NONFORMULARY, daily Fiber con, Disp: , Rfl:     Psyllium (METAMUCIL FIBER PO), Take by mouth daily, Disp: , Rfl:     cloNIDine (CATAPRES) 0.1 MG tablet, Take 1 tablet by mouth as needed for High Blood Pressure Take if systolic blood pressure greater than 915 or diastolic blood pressure greater than 100, Disp: 60 tablet, Rfl: 3    Ascorbic Acid (VITAMIN C) 1000 MG tablet, Take 1,000 mg by mouth 2 times daily, Disp: , Rfl:     Cholecalciferol (VITAMIN D3) 50 MCG (2000 UT) CAPS, Take by mouth, Disp: , Rfl:     Multiple Vitamin (MULTI-VITAMIN DAILY PO), Take by mouth, Disp: , Rfl:     allopurinol (ZYLOPRIM) 100 MG tablet, Take 100 mg by mouth daily, Disp: , Rfl:     metFORMIN (GLUCOPHAGE) 500 MG tablet, Take 500 mg by mouth 2 times daily (with meals), Disp: , Rfl:     amLODIPine (NORVASC) 5 MG tablet, Take 10 mg by mouth daily , Disp: , Rfl:     lamoTRIgine (LAMICTAL) 25 MG tablet, Take 25 mg by mouth daily, Disp: , Rfl:     Past Medical History  Jules Field  has a past medical history of Anxiety, Arthritis, Depression, Diabetes ([address] Hyperlipidemia, and Hypertension. Social History  Jules Field  reports that he has never smoked. He has never used smokeless tobacco. He reports that he does not drink alcohol or use drugs. Family History  Jules Field family history includes Cancer in his maternal uncle and mother; Diabetes type 2  in his father; No Known Problems in his brother, brother, maternal grandfather, maternal grandmother, paternal grandfather, paternal grandmother, and sister. Past Surgical History   Past Surgical History:   Procedure Laterality Date    CHOLECYSTECTOMY  06/17/2020    [facility]    CYST REMOVAL  1993    tailbone    CYST REMOVAL  06/17/2020    [facility]    [place] COLON CA SCRN NOT  [address] [facility] N/A 10/15/2018    COLONOSCOPY performed by Ravi Robledo DO at [facility]    UPPER GASTROINTESTINAL ENDOSCOPY  06/17/2020    [facility]       Subjective:     REVIEW OF SYSTEMS  Constitutional: denies sweats, chills and fever  HENT: denies  congestion, sinus pressure, sneezing and sore throat. Eyes: denies  pain, discharge, redness and itching. Respiratory: denies apnea, cough  Gastrointestinal: denies blood in stool, constipation, diarrhea   Endocrine: denies cold intolerance, heat intolerance, polydipsia. Genitourinary: denies dysuria, enuresis, flank pain and hematuria. Musculoskeletal: denies arthralgias, joint swelling and neck pain. Neurological: denies numbness and headaches. Psychiatric/Behavioral: denies agitation, confusion, decreased concentration and dysphoric mood    All others reviewed and are negative.    Objective:     BP (!) 144/80   Pulse 69   Ht 5' 11\" (1.803 m)   Wt 274 lb (124.3 kg)   BMI 38.22 kg/m²     Wt Readings from Last 3 Encounters: 05/13/21 274 lb (124.3 kg)   07/02/20 252 lb 13.9 oz (114.7 kg)   05/14/20 252 lb 14.4 oz (114.7 kg)     BP Readings from Last 3 Encounters:   05/13/21 (!) 144/80   07/02/20 122/75   05/14/20 120/82       PHYSICAL EXAM  Constitutional: Oriented to person, place, and time. Appears well-developed and well-nourished. HENT:   Head: Normocephalic and atraumatic. Eyes: EOM are normal. Pupils are equal, round, and reactive to light. Neck: Normal range of motion. Neck supple. No JVD present. Cardiovascular: Normal rate , normal heart sounds and intact distal pulses. Pulmonary/Chest: Effort normal and breath sounds normal. No respiratory distress. No wheezes. No rales. Abdominal: Soft. Bowel sounds are normal. No distension. There is no tenderness. Musculoskeletal: Normal range of motion. No edema. Neurological: Alert and oriented to person, place, and time. No cranial nerve deficit. Coordination normal.   Skin: Skin is warm and dry. Psychiatric: Normal mood and affect.        No results found for: CKTOTAL, CKMB, CKMBINDEX    Lab Results   Component Value Date    WBC 7.7 06/08/2020    WBC 10.7 02/06/2020    RBC 4.86 06/08/2020    HGB 15.0 06/08/2020    HCT 44.5 06/08/2020    MCV 92 06/08/2020    MCH 30.9 06/08/2020    MCHC 33.8 06/08/2020    RDW 13.2 06/08/2020     06/08/2020     02/06/2020    MPV 9.0 06/08/2020       Lab Results   Component Value Date     06/08/2020    K 4.0 06/08/2020     06/08/2020    CO2 27 06/08/2020    BUN 16 06/08/2020    LABALBU 4.6 02/06/2020    CREATININE 1.06 06/08/2020    CALCIUM 9.4 06/08/2020    LABGLOM 79 02/06/2020    GLUCOSE 101 06/08/2020       Lab Results   Component Value Date    ALKPHOS 33 02/06/2020    ALT 42 02/06/2020    AST 30 02/06/2020    PROT 7.5 02/06/2020    BILITOT 0.3 02/06/2020    BILIDIR <0.2 02/06/2020    LABALBU 4.6 02/06/2020       Lab Results   Component Value Date    MG 2.0 02/06/2020       No results found for: INR, PROTIME No results found for: LABA1C    No results found for: TRIG, HDL, LDLCALC, LDLDIRECT, LABVLDL    Lab Results   Component Value Date    TSH 3.300 02/06/2020         Testing Reviewed:      I haveindividually reviewed the below cardiac tests    EKG:    ECHO: No results found for this or any previous visit. STRESS:    CATH:    Assessment/Plan       Diagnosis Orders   1. PVC (premature ventricular contraction)  EKG 12 lead     Hx PVCs  DM  HTN--uncontrolled  HLD  Obesity  Anxiety    Reviewed 48 hr holter, Echo and Lexiscan stress test  Labs are within normal results  BP uncontrolled  Was give losartan last time. If elevated will start Coreg 3.125mg BID  The patient is asked to make an attempt to improve diet and exercise patterns to aid in medical management of this problem. Advised more plant based nutrition/meditarrean diet   Advised patient to call office or seek immediate medical attention if there is any new onset of  any chest pain, sob, palpitations, lightheadedness, dizziness, orthopnea, PND or pedal edema. All medication side effects were discussed in details. Thank youfor allowing me to participate in the care of this patient. Please do not hesitate to contact me for any further questions. Return in about 6 months (around 11/13/2021), or if symptoms worsen or fail to improve, for Regular follow up, Review testing.        Electronically signed by Regina Ceron MD [address]   5/13/2021 at 12:55 PM

## (undated) DEVICE — IV START KIT: Brand: MEDLINE INDUSTRIES, INC.

## (undated) DEVICE — SOLUTION IV 1000ML 0.45% SOD CHL PH 5 INJ USP VIAFLX PLAS

## (undated) DEVICE — SET ADMIN 25ML L117IN PMP MOD CK VLV RLER CLMP 2 SMRTSITE

## (undated) DEVICE — LINER SUCT CANSTR 1500CC SEMI RIG W/ POR HYDROPHOBIC SHUT

## (undated) DEVICE — CATHETER ETER IV 22GA L1IN POLYUR STR RADPQ INTROCAN SFTY

## (undated) DEVICE — SET LNR RED GRN W/ BASE CLEANASCOPE